# Patient Record
Sex: MALE | Race: WHITE
[De-identification: names, ages, dates, MRNs, and addresses within clinical notes are randomized per-mention and may not be internally consistent; named-entity substitution may affect disease eponyms.]

---

## 2019-11-20 ENCOUNTER — HOSPITAL ENCOUNTER (OUTPATIENT)
Dept: HOSPITAL 75 - ER | Age: 29
Setting detail: OBSERVATION
LOS: 1 days | Discharge: PSYCHIATRIC OR SUBSTANCE USE FACILITY | End: 2019-11-21
Attending: INTERNAL MEDICINE | Admitting: INTERNAL MEDICINE
Payer: SELF-PAY

## 2019-11-20 VITALS — WEIGHT: 250.45 LBS | HEIGHT: 71.65 IN | BODY MASS INDEX: 34.3 KG/M2

## 2019-11-20 DIAGNOSIS — F32.9: ICD-10-CM

## 2019-11-20 DIAGNOSIS — R45.851: Primary | ICD-10-CM

## 2019-11-20 DIAGNOSIS — F10.129: ICD-10-CM

## 2019-11-20 DIAGNOSIS — Z91.5: ICD-10-CM

## 2019-11-20 DIAGNOSIS — F19.90: ICD-10-CM

## 2019-11-20 DIAGNOSIS — F17.299: ICD-10-CM

## 2019-11-20 LAB
ALBUMIN SERPL-MCNC: 5.3 GM/DL (ref 3.2–4.5)
ALP SERPL-CCNC: 59 U/L (ref 40–136)
ALT SERPL-CCNC: 16 U/L (ref 0–55)
BASOPHILS # BLD AUTO: 0.2 10^3/UL (ref 0–0.1)
BASOPHILS NFR BLD AUTO: 1 % (ref 0–10)
BILIRUB SERPL-MCNC: 0.5 MG/DL (ref 0.1–1)
BUN/CREAT SERPL: 9
CALCIUM SERPL-MCNC: 9.4 MG/DL (ref 8.5–10.1)
CHLORIDE SERPL-SCNC: 105 MMOL/L (ref 98–107)
CO2 SERPL-SCNC: 20 MMOL/L (ref 21–32)
CREAT SERPL-MCNC: 1.03 MG/DL (ref 0.6–1.3)
EOSINOPHIL # BLD AUTO: 0.1 10^3/UL (ref 0–0.3)
EOSINOPHIL NFR BLD AUTO: 1 % (ref 0–10)
ERYTHROCYTE [DISTWIDTH] IN BLOOD BY AUTOMATED COUNT: 14 % (ref 10–14.5)
GFR SERPLBLD BASED ON 1.73 SQ M-ARVRAT: > 60 ML/MIN
GLUCOSE SERPL-MCNC: 69 MG/DL (ref 70–105)
HCT VFR BLD CALC: 48 % (ref 40–54)
HGB BLD-MCNC: 16.4 G/DL (ref 13.3–17.7)
LYMPHOCYTES # BLD AUTO: 3.4 X 10^3 (ref 1–4)
LYMPHOCYTES NFR BLD AUTO: 29 % (ref 12–44)
MANUAL DIFFERENTIAL PERFORMED BLD QL: NO
MCH RBC QN AUTO: 32 PG (ref 25–34)
MCHC RBC AUTO-ENTMCNC: 34 G/DL (ref 32–36)
MCV RBC AUTO: 95 FL (ref 80–99)
MONOCYTES # BLD AUTO: 0.5 X 10^3 (ref 0–1)
MONOCYTES NFR BLD AUTO: 5 % (ref 0–12)
NEUTROPHILS # BLD AUTO: 7.4 X 10^3 (ref 1.8–7.8)
NEUTROPHILS NFR BLD AUTO: 64 % (ref 42–75)
PLATELET # BLD: 394 10^3/UL (ref 130–400)
PMV BLD AUTO: 9.9 FL (ref 7.4–10.4)
POTASSIUM SERPL-SCNC: 3.7 MMOL/L (ref 3.6–5)
PROT SERPL-MCNC: 8.2 GM/DL (ref 6.4–8.2)
SALICYLATES SERPL-MCNC: < 5 MG/DL (ref 5–20)
SODIUM SERPL-SCNC: 148 MMOL/L (ref 135–145)
WBC # BLD AUTO: 11.6 10^3/UL (ref 4.3–11)

## 2019-11-20 PROCEDURE — 80320 DRUG SCREEN QUANTALCOHOLS: CPT

## 2019-11-20 PROCEDURE — 36415 COLL VENOUS BLD VENIPUNCTURE: CPT

## 2019-11-20 PROCEDURE — 80329 ANALGESICS NON-OPIOID 1 OR 2: CPT

## 2019-11-20 PROCEDURE — 87081 CULTURE SCREEN ONLY: CPT

## 2019-11-20 PROCEDURE — 80306 DRUG TEST PRSMV INSTRMNT: CPT

## 2019-11-20 PROCEDURE — 93005 ELECTROCARDIOGRAM TRACING: CPT

## 2019-11-20 PROCEDURE — 83735 ASSAY OF MAGNESIUM: CPT

## 2019-11-20 PROCEDURE — 85025 COMPLETE CBC W/AUTO DIFF WBC: CPT

## 2019-11-20 PROCEDURE — 81000 URINALYSIS NONAUTO W/SCOPE: CPT

## 2019-11-20 PROCEDURE — 80048 BASIC METABOLIC PNL TOTAL CA: CPT

## 2019-11-20 PROCEDURE — 80053 COMPREHEN METABOLIC PANEL: CPT

## 2019-11-20 PROCEDURE — 93041 RHYTHM ECG TRACING: CPT

## 2019-11-21 VITALS — SYSTOLIC BLOOD PRESSURE: 126 MMHG | DIASTOLIC BLOOD PRESSURE: 75 MMHG

## 2019-11-21 VITALS — DIASTOLIC BLOOD PRESSURE: 76 MMHG | SYSTOLIC BLOOD PRESSURE: 135 MMHG

## 2019-11-21 VITALS — DIASTOLIC BLOOD PRESSURE: 77 MMHG | SYSTOLIC BLOOD PRESSURE: 131 MMHG

## 2019-11-21 VITALS — DIASTOLIC BLOOD PRESSURE: 91 MMHG | SYSTOLIC BLOOD PRESSURE: 145 MMHG

## 2019-11-21 VITALS — SYSTOLIC BLOOD PRESSURE: 133 MMHG | DIASTOLIC BLOOD PRESSURE: 70 MMHG

## 2019-11-21 VITALS — SYSTOLIC BLOOD PRESSURE: 138 MMHG | DIASTOLIC BLOOD PRESSURE: 72 MMHG

## 2019-11-21 VITALS — DIASTOLIC BLOOD PRESSURE: 77 MMHG | SYSTOLIC BLOOD PRESSURE: 130 MMHG

## 2019-11-21 VITALS — SYSTOLIC BLOOD PRESSURE: 126 MMHG | DIASTOLIC BLOOD PRESSURE: 78 MMHG

## 2019-11-21 LAB
APAP SERPL-MCNC: < 10 UG/ML (ref 10–30)
APTT PPP: YELLOW S
BACTERIA #/AREA URNS HPF: (no result) /HPF
BARBITURATES UR QL: NEGATIVE
BASOPHILS # BLD AUTO: 0.1 10^3/UL (ref 0–0.1)
BASOPHILS NFR BLD AUTO: 1 % (ref 0–10)
BENZODIAZ UR QL SCN: POSITIVE
BILIRUB UR QL STRIP: NEGATIVE
BUN/CREAT SERPL: 16
CALCIUM SERPL-MCNC: 8.7 MG/DL (ref 8.5–10.1)
CHLORIDE SERPL-SCNC: 102 MMOL/L (ref 98–107)
CO2 SERPL-SCNC: 16 MMOL/L (ref 21–32)
COCAINE UR QL: NEGATIVE
CREAT SERPL-MCNC: 0.76 MG/DL (ref 0.6–1.3)
EOSINOPHIL # BLD AUTO: 0 10^3/UL (ref 0–0.3)
EOSINOPHIL NFR BLD AUTO: 0 % (ref 0–10)
ERYTHROCYTE [DISTWIDTH] IN BLOOD BY AUTOMATED COUNT: 14 % (ref 10–14.5)
FIBRINOGEN PPP-MCNC: CLEAR MG/DL
GFR SERPLBLD BASED ON 1.73 SQ M-ARVRAT: > 60 ML/MIN
GLUCOSE SERPL-MCNC: 66 MG/DL (ref 70–105)
GLUCOSE UR STRIP-MCNC: NEGATIVE MG/DL
HCT VFR BLD CALC: 46 % (ref 40–54)
HGB BLD-MCNC: 15.8 G/DL (ref 13.3–17.7)
HYALINE CASTS #/AREA URNS LPF: (no result) /LPF
KETONES UR QL STRIP: (no result)
LEUKOCYTE ESTERASE UR QL STRIP: NEGATIVE
LYMPHOCYTES # BLD AUTO: 2.9 X 10^3 (ref 1–4)
LYMPHOCYTES NFR BLD AUTO: 25 % (ref 12–44)
MAGNESIUM SERPL-MCNC: 1.8 MG/DL (ref 1.6–2.4)
MANUAL DIFFERENTIAL PERFORMED BLD QL: NO
MCH RBC QN AUTO: 33 PG (ref 25–34)
MCHC RBC AUTO-ENTMCNC: 34 G/DL (ref 32–36)
MCV RBC AUTO: 96 FL (ref 80–99)
METHADONE UR QL SCN: NEGATIVE
METHAMPHETAMINE SCREEN URINE S: NEGATIVE
MONOCYTES # BLD AUTO: 0.5 X 10^3 (ref 0–1)
MONOCYTES NFR BLD AUTO: 4 % (ref 0–12)
NEUTROPHILS # BLD AUTO: 7.9 X 10^3 (ref 1.8–7.8)
NEUTROPHILS NFR BLD AUTO: 70 % (ref 42–75)
NITRITE UR QL STRIP: NEGATIVE
OPIATES UR QL SCN: NEGATIVE
OXYCODONE UR QL: NEGATIVE
PH UR STRIP: 5.5 [PH] (ref 5–9)
PLATELET # BLD: 357 10^3/UL (ref 130–400)
PMV BLD AUTO: 9.7 FL (ref 7.4–10.4)
POTASSIUM SERPL-SCNC: 4.3 MMOL/L (ref 3.6–5)
PROPOXYPH UR QL: NEGATIVE
PROT UR QL STRIP: NEGATIVE
RBC #/AREA URNS HPF: (no result) /HPF
SODIUM SERPL-SCNC: 140 MMOL/L (ref 135–145)
SP GR UR STRIP: 1.02 (ref 1.02–1.02)
SQUAMOUS #/AREA URNS HPF: (no result) /HPF
TRICYCLICS UR QL SCN: NEGATIVE
WBC # BLD AUTO: 11.4 10^3/UL (ref 4.3–11)
WBC #/AREA URNS HPF: (no result) /HPF

## 2019-11-21 NOTE — NUR
BRIEFLY SPOKE WITH PT ABOUT THOUGHTS OF SUICIDE. PT STATES HE DOES NOT REALLY WANT TO TALK 
ABOUT IT BUT THAT HIS SUICIDAL THOUGHTS HAVE BEEN ON-GOING FOR MULTIPLE YEARS. HE STATES 
THAT HE KNOWS THAT HE NEEDED HELP SO HE CAME TO THE ER. STATES HE WILL NOT HARM HIMSELF 
DURING HIS HOSPITALIZATION AND SIGNED A NO-HARM CONTRACT AND THIS RN PLACED IT IN HIS CHART. 
TELE-SITTER HAS BEEN ORDERED AND EXPLAINED TO THE PT AS TO WHY IT HAS BEEN PLACED IN HIM 
ROOM. PT IS VERY COOPERATIVE AND THANKFUL.

## 2019-11-21 NOTE — NUR
Arrangements made with [facility] for pt admission for psychiatric 
treatment. Pt agreeable and cooperative.He didn't want anyone notified of his transfer. 
Addie Vanessa will provide secure transport to [facility]  on this date. Pt acknowledges 
severe depression. His mother Shellie  from cancer recently. He is living with his 
step-father and is unemployed. He presents flat depressed affect and appears tearful when 
talking about his mother's recent death. Pt' transport should be here between 12:30 and 1300 
hr.

## 2019-11-21 NOTE — PULMONARY CONSULTATION
History of Present Illness


History of Present Illness


Time Seen by Provider:  06:17


Date of Admission





History of Present Illness








Allergies and Home Medications


Allergies


Coded Allergies:  


     No Known Drug Allergies (Unverified , 11/21/19)





Past Medical-Social-Family Hx


Past Med/Social Hx:  Reviewed Nursing Past Med/Soc Hx


Patient Social History


Alcohol Use:  Occasionally Uses


Recreational Drug Use:  Yes (marijuana)


Type Used:  Smokeless Tobacco


Recent Foreign Travel:  No


Contact w/Someone Who Travel:  No


Recent Infectious Disease Expo:  No


Recent Hopitalizations:  No


Physical Abuse:  No


Sexual Abuse:  No


Mistreated:  No


Fear:  No





Seasonal Allergies


Seasonal Allergies:  No





Past Medical History


Surgeries:  No


Respiratory:  No


Cardiac:  No


Neurological:  No


Genitourinary:  No


Gastrointestinal:  No


Musculoskeletal:  No


Endocrine:  No


Cancer:  No


Psychosocial:  Yes


Depression


Integumentary:  No


Blood Disorders:  No





Family Medical History


Reviewed Nursing Family Hx





Sepsis Event


Evaluation


Height, Weight, BMI


Height: '"


Weight: lbs. oz. kg; 34.00 BMI


Method:





Exam


Exam





Vital Signs








  Date Time  Temp Pulse Resp B/P (MAP) Pulse Ox O2 Delivery O2 Flow Rate FiO2


 


11/21/19 05:00 37.0 72 13 131/77 (95) 99 Room Air  


 


11/21/19 04:00 36.6 78 17 133/70 (91) 99 Room Air  


 


11/21/19 04:00     98 Room Air  


 


11/21/19 03:00 36.5 82 15 135/76 (95) 98 Room Air  


 


11/21/19 02:29     98 Room Air  


 


11/21/19 02:00 36.5 85 16 145/91 (109) 98 Room Air  


 


11/21/19 01:33 37.0 86 20 133/84 (113) 100 Room Air  


 


11/20/19 23:36 37.0 115 20 158/91 (113) 98 Room Air  














I & O 


 


 11/21/19





 07:00


 


Intake Total 0 ml


 


Output Total 0 ml


 


Balance 0 ml








Height & Weight


Height: '"


Weight: lbs. oz. kg; 34.00 BMI


Method:


Capillary Refill:  Less Than 3 Seconds


Gastrointestinal:  non tender, soft





Results


Lab


Laboratory Tests


11/20/19 23:31








11/21/19 02:55











Assessment/Plan


Assessment/Plan


ETOH intoxication 


   -Monitor for withdrawals 


Depression with hx of suicidal ideation 


   -Consult behavioral ESTHELA Garibay DO              Nov 21, 2019 06:19


POS

## 2019-11-21 NOTE — NUR
PT FLAT AND WITHDRAWN DURING INTERACTION.  HE DENIED THOUGHTS OF SUICIDE BUT JUST STATED HE 
WAS TRYING NOT TO THINK ABOUT IT.  PER DR. CONWAY VITALS CAN BE OBTAINED EVERY FOUR HOURS.  
ALL CORDS OUT OF ROOM SECONDARY TO PATIENTS HISTORY OF ATTEMPTED HANGING.  NO TELE ON.  TELE 
SITTER AT BEDSIDE.  SPOKE WITH SAVE LINE AT 0750 AND WILL COME AND EVALUATE PATIENT IN "A 
FEW HOURS".

## 2019-11-21 NOTE — ED PSYCHOSOCIAL
General


Chief Complaint:  Psych/Social Disorder


Stated Complaint:  PSYCH EVAL


Nursing Triage Note:  


Pt to [facility] 8 with c/o "feeling nutty". Pt states he has Hx of depression, does not




take any meds for. Pt reports drinking 2 pints of whiskey tonight. Pt 


interrupted me during a question and states "I need to be strapped down to a 


gurney". Pt states he attempted suicide a few months ago by "hanging with belt".




 Pt states he has the same thoughts and plans tonight. Pt has changed into gown 


and belongings are in a bag behind doors.


Source:  patient


Exam Limitations:  intoxication





History of Present Illness


Date Seen by Provider:  Nov 20, 2019


Time Seen by Provider:  23:45


Initial Comments


Here with report of drinking 2 pints of whiskey tonight rapidly. States that he 

felt like he was given a snap. He is becoming increasingly slurred of speech. 

States he tried to hang himself a couple of months ago and that he is quite 

depressed. Very difficult to get any significant information out of him due to 

intoxication. No obvious injury. Denies taking any pills or other substances. T

old triage nurse about concerns of suicidal ideation.


Timing/Duration:  this evening


Severity:  moderate, severe


Associated Symptoms:  suicidal ideation





Allergies and Home Medications


Patient Home Medication List


Home Medication List Reviewed:  Yes





Review of Systems


Constitutional:  see HPI; No chills, No fever


Psychiatric/Neurological:  Anxiety, Depressed, Emotional Problems


Unable to complete review of systems due to intoxication





Past Medical-Social-Family Hx


Past Med/Social Hx:  Reviewed Nursing Past Med/Soc Hx


Patient Social History


Alcohol Use:  Occasionally Uses


Recreational Drug Use:  Yes (marijuana)


Type Used:  Smokeless Tobacco


Recent Foreign Travel:  No


Contact w/Someone Who Travel:  No


Recent Infectious Disease Expo:  No


Recent Hopitalizations:  No


Physical Abuse:  No


Sexual Abuse:  No


Mistreated:  No


Fear:  No





Seasonal Allergies


Seasonal Allergies:  No





Past Medical History


Surgeries:  No


Respiratory:  No


Cardiac:  No


Neurological:  No


Genitourinary:  No


Gastrointestinal:  No


Musculoskeletal:  No


Endocrine:  No


Cancer:  No


Psychosocial:  Yes


Depression


Integumentary:  No


Blood Disorders:  No





Family Medical History


Reviewed Nursing Family Hx





Physical Exam





Vital Signs - First Documented








 11/20/19





 23:36


 


Temp 37.0


 


Pulse 115


 


Resp 20


 


B/P (MAP) 158/91 (113)


 


Pulse Ox 98


 


O2 Delivery Room Air





Capillary Refill : Less Than 3 Seconds


Height, Weight, BMI


Height: '"


Weight: lbs. oz. kg; 34.00 BMI


Method:


General Appearance:  WD/WN, no apparent distress


HEENT:  PERRL/EOMI, pharynx normal


Neck:  full range of motion, supple


Respiratory:  lungs clear, normal breath sounds


Cardiovascular:  no murmur, tachycardia


Gastrointestinal:  non tender, soft


Extremities:  non-tender, normal inspection


Neurologic/Psychiatric:  other (slurred speech and was answers a few questions 

but is quite intoxicated. Does arouse to verbal. Unable to determine true 

orientation.)


Appearance/Memory:  disheveled, impaired insight


Behavior/Eye Contact:  decreased rate of speech


Thoughts/Hallucinations:  other (unable to determine due to intoxication)


Skin:  normal color, damp





Progress/Results/Core Measures


Results/Orders


Lab Results





Laboratory Tests








Test


 11/20/19


23:31 11/21/19


00:25 Range/Units


 


 


White Blood Count


 11.6 H


 


 4.3-11.0


10^3/uL


 


Red Blood Count


 5.10 


 


 4.35-5.85


10^6/uL


 


Hemoglobin 16.4   13.3-17.7  G/DL


 


Hematocrit 48   40-54  %


 


Mean Corpuscular Volume 95   80-99  FL


 


Mean Corpuscular Hemoglobin 32   25-34  PG


 


Mean Corpuscular Hemoglobin


Concent 34 


 


 32-36  G/DL





 


Red Cell Distribution Width 14.0   10.0-14.5  %


 


Platelet Count


 394 


 


 130-400


10^3/uL


 


Mean Platelet Volume 9.9   7.4-10.4  FL


 


Neutrophils (%) (Auto) 64   42-75  %


 


Lymphocytes (%) (Auto) 29   12-44  %


 


Monocytes (%) (Auto) 5   0-12  %


 


Eosinophils (%) (Auto) 1   0-10  %


 


Basophils (%) (Auto) 1   0-10  %


 


Neutrophils # (Auto) 7.4   1.8-7.8  X 10^3


 


Lymphocytes # (Auto) 3.4   1.0-4.0  X 10^3


 


Monocytes # (Auto) 0.5   0.0-1.0  X 10^3


 


Eosinophils # (Auto)


 0.1 


 


 0.0-0.3


10^3/uL


 


Basophils # (Auto)


 0.2 H


 


 0.0-0.1


10^3/uL


 


Sodium Level 148 H  135-145  MMOL/L


 


Potassium Level 3.7   3.6-5.0  MMOL/L


 


Chloride Level 105     MMOL/L


 


Carbon Dioxide Level 20 L  21-32  MMOL/L


 


Anion Gap 23 H  5-14  MMOL/L


 


Blood Urea Nitrogen 9   7-18  MG/DL


 


Creatinine


 1.03 


 


 0.60-1.30


MG/DL


 


Estimat Glomerular Filtration


Rate > 60 


 


  





 


BUN/Creatinine Ratio 9    


 


Glucose Level 69 L    MG/DL


 


Calcium Level 9.4   8.5-10.1  MG/DL


 


Corrected Calcium    8.5-10.1  MG/DL


 


Total Bilirubin 0.5   0.1-1.0  MG/DL


 


Aspartate Amino Transf


(AST/SGOT) 21 


 


 5-34  U/L





 


Alanine Aminotransferase


(ALT/SGPT) 16 


 


 0-55  U/L





 


Alkaline Phosphatase 59     U/L


 


Total Protein 8.2   6.4-8.2  GM/DL


 


Albumin 5.3 H  3.2-4.5  GM/DL


 


Salicylates Level < 5.0 L  5.0-20.0  MG/DL


 


Acetaminophen Level < 10 L  10-30  UG/ML


 


Serum Alcohol 342 *H  <10  MG/DL


 


Urine Color  YELLOW   


 


Urine Clarity  CLEAR   


 


Urine pH  5.5  5-9  


 


Urine Specific Gravity  1.025 H 1.016-1.022  


 


Urine Protein  NEGATIVE  NEGATIVE  


 


Urine Glucose (UA)  NEGATIVE  NEGATIVE  


 


Urine Ketones  1+ H NEGATIVE  


 


Urine Nitrite  NEGATIVE  NEGATIVE  


 


Urine Bilirubin  NEGATIVE  NEGATIVE  


 


Urine Urobilinogen  0.2  < = 1.0  MG/DL


 


Urine Leukocyte Esterase  NEGATIVE  NEGATIVE  


 


Urine RBC (Auto)  TRACE-I  NEGATIVE  


 


Urine RBC  NONE   /HPF


 


Urine WBC  NONE   /HPF


 


Urine Squamous Epithelial


Cells 


 RARE 


  /HPF





 


Urine Crystals  NONE   /LPF


 


Urine Bacteria  TRACE   /HPF


 


Urine Casts  PRESENT   /LPF


 


Urine Hyaline Casts  RARE   /LPF


 


Urine Mucus  SMALL H  /LPF


 


Urine Culture Indicated  NO   


 


Urine Opiates Screen  NEGATIVE  NEGATIVE  


 


Urine Oxycodone Screen  NEGATIVE  NEGATIVE  


 


Urine Methadone Screen  NEGATIVE  NEGATIVE  


 


Urine Propoxyphene Screen  NEGATIVE  NEGATIVE  


 


Urine Barbiturates Screen  NEGATIVE  NEGATIVE  


 


Ur Tricyclic Antidepressants


Screen 


 NEGATIVE 


 NEGATIVE  





 


Urine Phencyclidine Screen  NEGATIVE  NEGATIVE  


 


Urine Amphetamines Screen  NEGATIVE  NEGATIVE  


 


Urine Methamphetamines Screen  NEGATIVE  NEGATIVE  


 


Urine Benzodiazepines Screen  POSITIVE H NEGATIVE  


 


Urine Cocaine Screen  NEGATIVE  NEGATIVE  


 


Urine Cannabinoids Screen  POSITIVE H NEGATIVE  








My Orders





Orders - RAYA WOLFE MD


Ua Culture If Indicated (11/20/19 23:08)


Cbc With Automated Diff (11/20/19 23:08)


Comprehensive Metabolic Panel (11/20/19 23:08)


Alcohol (11/20/19 23:08)


Drug Screen Stat (Urine) (11/20/19 23:08)


Acetaminophen (11/20/19 23:08)


Salicylate (11/20/19 23:08)


Ekg Tracing (11/20/19 23:08)


Ed Iv/Invasive Line Start (11/20/19 23:08)


Monitor-Rhythm Ecg Trace Only (11/20/19 23:08)


Bh Status Checks/Observation Q15M (11/20/19 23:08)


Ed Iv/Invasive Line Start (11/20/19 23:08)





Vital Signs/I&O











 11/20/19





 23:36


 


Temp 37.0


 


Pulse 115


 


Resp 20


 


B/P (MAP) 158/91 (113)


 


Pulse Ox 98


 


O2 Delivery Room Air














Blood Pressure Mean:                    113


POS








Progress


Progress Note :  


Progress Note


Seen and evaluated. IV, labs, EKG, UA, UDS LR 1 L bolus. Monitor patient. 0042: 

Alcohol 342. Patient is quite intoxicated. We are unable to determine suicide 

ideation or intent due to significant intoxication. Rest of the labs reviewed. I

did discuss the case with Dr. BALLARD. He accepts patient for admission, 

observation status. Further mental health evaluation when medically cleared. I 

did discuss this with the patient and he seemed to agree.


Initial ECG Impression Date:  Nov 20, 2019


Initial ECG Impression Time:  23:34


Initial ECG Rate:  98


Initial ECG Rhythm:  Normal Sinus


Comment


Sinus rhythm with left atrial abdomen bilaterally. Normal axis. No evidence of 

ST elevation MI. No previous available that is an accurate EKG. Interpreted by 

me.





Departure


Communication (Admissions)


Time/Spoke to Admitting Phy:  00:42





Impression





   Primary Impression:  


   Alcohol abuse


   Additional Impressions:  


   Alcohol intoxication


   Qualified Codes:  F10.920 - Alcohol use, unspecified with intoxication, 

   uncomplicated


   Depression with suicidal ideation


Disposition:  09 ADMITTED AS INPATIENT


Condition:  Stable





Admissions


Decision to Admit Reason:  Admit from ER (General)


Decision to Admit/Date:  Nov 21, 2019


Time/Decision to Admit Time:  00:42





Departure-Patient Inst.


Referrals:  


JALEN SIMMONS MD (PCP/Family)


Primary Care Physician











RAYA WOLFE MD          Nov 21, 2019 00:54


POS

## 2019-11-21 NOTE — SHORT STAY SUMMARY-HOSPITALIST
History of Present Illness


HPI/Chief Complaint


Pt is a 28yoCM with a PMH of depression and suicide attempts who presented to 

the ER due to suicidal ideation. He states that he doesn't like to talk about it

and is quite guarded. He has a history of suicide attempts and previous attempts

include hanging himself with a belt. He states this was his plan again. He does 

not have access to guns at this time. He states he is still having thoughts of 

hurting himself and otherwise does not want to talk about it. He has relayed to 

staff that his mom recently  of cancer and that has been an acute stressor. 

He was admitted last night due to alcohol intoxication for mental health 

evaluation this morning now that he is sober.


Source:  patient


Date Seen


19


Time Seen by a Provider:  08:43


Attending Physician


Barrie Guzman MD


PCP


Corona Singh MD


Referring Physician





Date of Admission


2019 at 00:47





Home Medications & Allergies


Home Medications


Reviewed patient Home Medication Reconciliation performed by pharmacy medication

reconciliations technician and/or nursing.


Patients Allergies have been reviewed.





Allergies





Allergies


Coded Allergies


  No Known Drug Allergies ([id number]/21/19)








Past Medical-Social-Family Hx


Past Med/Social Hx:  Reviewed Nursing Past Med/Soc Hx


Patient Social History


Alcohol Use:  Occasionally Uses


Recreational Drug Use:  Yes (marijuana)


Type Used:  Smokeless Tobacco


Recent Foreign Travel:  No


Contact w/other who traveled:  No


Recent Hopitalizations:  No


Recent Infectious Disease Expo:  No





Seasonal Allergies


Seasonal Allergies:  No





Past Medical History


Psychosocial:  Suicide Attempts, Depression


History of Blood Disorders:  No





Family History


Reviewed Nursing Family Hx


Cancer





Review of Systems


Constitutional:  no symptoms reported


EENTM:  no symptoms reported


Respiratory:  no symptoms reported


Cardiovascular:  no symptoms reported


Gastrointestinal:  no symptoms reported


Genitourinary:  no symptoms reported


Musculoskeletal:  no symptoms reported


Skin:  no symptoms reported


Psychiatric/Neurological:  See HPI, Depressed





Physical Exam


Physical Exam


Vital Signs





Vital Signs - First Documented








 19





 23:36


 


Temp 37.0


 


Pulse 115


 


Resp 20


 


B/P (MAP) 158/91 (113)


 


Pulse Ox 98


 


O2 Delivery Room Air





Capillary Refill : Less Than 3 Seconds


Height, Weight, BMI


Height: '"


Weight: lbs. oz. kg; 34.00 BMI


Method:


General Appearance:  No Apparent Distress, WD/WN, Obese


Respiratory:  Lungs Clear, No Accessory Muscle Use, No Respiratory Distress


Cardiovascular:  Regular Rate, Rhythm, No Murmur


Gastrointestinal:  Normal Bowel Sounds, Non Tender, Soft


Extremity:  No Calf Tenderness, No Pedal Edema


Neurologic/Psychiatric:  Alert, Oriented x3, Depressed Affect


Skin:  Normal Color, Warm/Dry





Results


Results/Procedures


Labs


Laboratory Tests


19 23:31








19 02:55








Patient resulted labs reviewed.





Short Stay Diagnosis


Discharge Diagnosis-Short Stay


Admission Diagnosis


Suicidal Ideation


Final Discharge Diagnosis


Suicidal Ideation





Conclusion


Plan


Suicidal Ideation


   History of attempts


   Agrees to voluntary placement


   I called and spoke with Dr Yanez at [facility] for psych admission and he accepted 

patient


   DC to psych with secure transfer





Diagnosis/Problems


Diagnosis/Problems





(1) Suicidal ideation


Status:  Acute


(2) Alcohol intoxication


Qualifiers:  


   Qualified Codes:  F10.920 - Alcohol use, unspecified with intoxication, 

uncomplicated





Clinical Quality Measures


DVT/VTE Risk/Contraindication:


RFS Level Per Nursing on Admit:  0=No Risk/No VTE PPX











PRITI CONWAY MD              2019 08:43


POS

## 2019-11-21 NOTE — NUR
PT TRANSPORTED VIA SECURE TRANSPORT TO [facility].  PT ONLY HAD CLOTHING FOR 
PERSONAL BELONGINGS.  REPORT CALLED TO ALICE CLARKE RN AT [facility].  IV REMOVED 
AND TIP INTACT.  PT AMBULATED INDEPENDENTLY TO TRANSPORT VEHICLE.

## 2019-12-20 ENCOUNTER — HOSPITAL ENCOUNTER (EMERGENCY)
Dept: HOSPITAL 75 - ER | Age: 29
Discharge: HOME | End: 2019-12-20
Payer: SELF-PAY

## 2019-12-20 VITALS — DIASTOLIC BLOOD PRESSURE: 69 MMHG | SYSTOLIC BLOOD PRESSURE: 107 MMHG

## 2019-12-20 VITALS — HEIGHT: 73.62 IN | BODY MASS INDEX: 36.4 KG/M2 | WEIGHT: 280.65 LBS

## 2019-12-20 DIAGNOSIS — S02.2XXA: Primary | ICD-10-CM

## 2019-12-20 DIAGNOSIS — F10.129: ICD-10-CM
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
## 2019-12-20 DIAGNOSIS — F32.9: ICD-10-CM

## 2019-12-20 DIAGNOSIS — Z91.5: ICD-10-CM

## 2019-12-20 LAB
ALBUMIN SERPL-MCNC: 4.6 GM/DL (ref 3.2–4.5)
ALP SERPL-CCNC: 48 U/L (ref 40–136)
ALT SERPL-CCNC: 21 U/L (ref 0–55)
AMYLASE SERPL-CCNC: 86 U/L (ref 25–125)
APAP SERPL-MCNC: < 10 UG/ML (ref 10–30)
APTT BLD: 29 SEC (ref 24–35)
APTT PPP: YELLOW S
BACTERIA #/AREA URNS HPF: (no result) /HPF
BARBITURATES UR QL: NEGATIVE
BASOPHILS # BLD AUTO: 0.1 10^3/UL (ref 0–0.1)
BASOPHILS NFR BLD AUTO: 1 % (ref 0–10)
BENZODIAZ UR QL SCN: POSITIVE
BILIRUB SERPL-MCNC: 0.2 MG/DL (ref 0.1–1)
BILIRUB UR QL STRIP: NEGATIVE
BUN/CREAT SERPL: 9
CALCIUM SERPL-MCNC: 9 MG/DL (ref 8.5–10.1)
CHLORIDE SERPL-SCNC: 107 MMOL/L (ref 98–107)
CO2 SERPL-SCNC: 31 MMOL/L (ref 21–32)
COCAINE UR QL: NEGATIVE
CREAT SERPL-MCNC: 0.82 MG/DL (ref 0.6–1.3)
EOSINOPHIL # BLD AUTO: 0.3 10^3/UL (ref 0–0.3)
EOSINOPHIL NFR BLD AUTO: 4 % (ref 0–10)
ERYTHROCYTE [DISTWIDTH] IN BLOOD BY AUTOMATED COUNT: 13.5 % (ref 10–14.5)
FIBRINOGEN PPP-MCNC: CLEAR MG/DL
GFR SERPLBLD BASED ON 1.73 SQ M-ARVRAT: > 60 ML/MIN
GLUCOSE SERPL-MCNC: 102 MG/DL (ref 70–105)
GLUCOSE UR STRIP-MCNC: NEGATIVE MG/DL
HCT VFR BLD CALC: 44 % (ref 40–54)
HGB BLD-MCNC: 14.7 G/DL (ref 13.3–17.7)
INR PPP: 0.9 (ref 0.8–1.4)
KETONES UR QL STRIP: NEGATIVE
LEUKOCYTE ESTERASE UR QL STRIP: NEGATIVE
LIPASE SERPL-CCNC: 27 U/L (ref 8–78)
LYMPHOCYTES # BLD AUTO: 3.5 X 10^3 (ref 1–4)
LYMPHOCYTES NFR BLD AUTO: 43 % (ref 12–44)
MAGNESIUM SERPL-MCNC: 2.1 MG/DL (ref 1.6–2.4)
MANUAL DIFFERENTIAL PERFORMED BLD QL: NO
MCH RBC QN AUTO: 33 PG (ref 25–34)
MCHC RBC AUTO-ENTMCNC: 34 G/DL (ref 32–36)
MCV RBC AUTO: 98 FL (ref 80–99)
METHADONE UR QL SCN: NEGATIVE
METHAMPHETAMINE SCREEN URINE S: NEGATIVE
MONOCYTES # BLD AUTO: 0.7 X 10^3 (ref 0–1)
MONOCYTES NFR BLD AUTO: 8 % (ref 0–12)
NEUTROPHILS # BLD AUTO: 3.7 X 10^3 (ref 1.8–7.8)
NEUTROPHILS NFR BLD AUTO: 44 % (ref 42–75)
NITRITE UR QL STRIP: NEGATIVE
OPIATES UR QL SCN: NEGATIVE
OXYCODONE UR QL: NEGATIVE
PH UR STRIP: 8 [PH] (ref 5–9)
PLATELET # BLD: 266 10^3/UL (ref 130–400)
PMV BLD AUTO: 9.8 FL (ref 7.4–10.4)
POTASSIUM SERPL-SCNC: 4.4 MMOL/L (ref 3.6–5)
PROPOXYPH UR QL: NEGATIVE
PROT SERPL-MCNC: 7 GM/DL (ref 6.4–8.2)
PROT UR QL STRIP: NEGATIVE
PROTHROMBIN TIME: 12.2 SEC (ref 12.2–14.7)
RBC #/AREA URNS HPF: (no result) /HPF
SODIUM SERPL-SCNC: 146 MMOL/L (ref 135–145)
SP GR UR STRIP: 1.01 (ref 1.02–1.02)
SQUAMOUS #/AREA URNS HPF: (no result) /HPF
TRICYCLICS UR QL SCN: NEGATIVE
WBC # BLD AUTO: 8.3 10^3/UL (ref 4.3–11)
WBC #/AREA URNS HPF: (no result) /HPF

## 2019-12-20 PROCEDURE — 80329 ANALGESICS NON-OPIOID 1 OR 2: CPT

## 2019-12-20 PROCEDURE — 90715 TDAP VACCINE 7 YRS/> IM: CPT

## 2019-12-20 PROCEDURE — 70450 CT HEAD/BRAIN W/O DYE: CPT

## 2019-12-20 PROCEDURE — 70486 CT MAXILLOFACIAL W/O DYE: CPT

## 2019-12-20 PROCEDURE — 81000 URINALYSIS NONAUTO W/SCOPE: CPT

## 2019-12-20 PROCEDURE — 71045 X-RAY EXAM CHEST 1 VIEW: CPT

## 2019-12-20 PROCEDURE — 85025 COMPLETE CBC W/AUTO DIFF WBC: CPT

## 2019-12-20 PROCEDURE — 83690 ASSAY OF LIPASE: CPT

## 2019-12-20 PROCEDURE — 80306 DRUG TEST PRSMV INSTRMNT: CPT

## 2019-12-20 PROCEDURE — 72170 X-RAY EXAM OF PELVIS: CPT

## 2019-12-20 PROCEDURE — 85610 PROTHROMBIN TIME: CPT

## 2019-12-20 PROCEDURE — 72125 CT NECK SPINE W/O DYE: CPT

## 2019-12-20 PROCEDURE — 36415 COLL VENOUS BLD VENIPUNCTURE: CPT

## 2019-12-20 PROCEDURE — 83735 ASSAY OF MAGNESIUM: CPT

## 2019-12-20 PROCEDURE — 82150 ASSAY OF AMYLASE: CPT

## 2019-12-20 PROCEDURE — 80320 DRUG SCREEN QUANTALCOHOLS: CPT

## 2019-12-20 PROCEDURE — 96361 HYDRATE IV INFUSION ADD-ON: CPT

## 2019-12-20 PROCEDURE — 90471 IMMUNIZATION ADMIN: CPT

## 2019-12-20 PROCEDURE — 96374 THER/PROPH/DIAG INJ IV PUSH: CPT

## 2019-12-20 PROCEDURE — 85730 THROMBOPLASTIN TIME PARTIAL: CPT

## 2019-12-20 PROCEDURE — 80053 COMPREHEN METABOLIC PANEL: CPT

## 2019-12-20 NOTE — NUR
This RN checked on pt at this time. Pt is resting in bed, no needs at this 
time. Call light in reach.

## 2019-12-20 NOTE — DIAGNOSTIC IMAGING REPORT
INDICATION: Intoxication



Pelvis



AP view of the pelvis shows no fracture or dislocation.



IMPRESSION: Negative pelvis



Dictated by: 



  Dictated on workstation # [id number]
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
## 2019-12-20 NOTE — DIAGNOSTIC IMAGING REPORT
INDICATION: Intoxication



Portable chest 1:36 AM



Heart size and pulmonary vascularity are normal. Lungs are clear.

There are no effusions or pneumothoraces.



IMPRESSION: Negative chest.



Dictated by: 



  Dictated on workstation # [id number]

## 2020-01-08 ENCOUNTER — HOSPITAL ENCOUNTER (EMERGENCY)
Dept: HOSPITAL 75 - ER | Age: 30
Discharge: HOME | End: 2020-01-08
Payer: SELF-PAY

## 2020-01-08 VITALS — WEIGHT: 240.3 LBS | HEIGHT: 71.97 IN | BODY MASS INDEX: 32.55 KG/M2

## 2020-01-08 VITALS — DIASTOLIC BLOOD PRESSURE: 67 MMHG | SYSTOLIC BLOOD PRESSURE: 123 MMHG
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
## 2020-01-08 DIAGNOSIS — F32.9: ICD-10-CM

## 2020-01-08 DIAGNOSIS — Z77.22: ICD-10-CM

## 2020-01-08 DIAGNOSIS — S02.2XXA: Primary | ICD-10-CM

## 2020-01-08 PROCEDURE — 70450 CT HEAD/BRAIN W/O DYE: CPT
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
## 2020-02-04 ENCOUNTER — HOSPITAL ENCOUNTER (EMERGENCY)
Dept: HOSPITAL 75 - ER | Age: 30
Discharge: HOME | End: 2020-02-04
Payer: SELF-PAY

## 2020-02-04 VITALS — WEIGHT: 240.52 LBS | BODY MASS INDEX: 32.58 KG/M2 | HEIGHT: 72.01 IN

## 2020-02-04 VITALS — SYSTOLIC BLOOD PRESSURE: 156 MMHG | DIASTOLIC BLOOD PRESSURE: 99 MMHG

## 2020-02-04 DIAGNOSIS — F10.129: ICD-10-CM

## 2020-02-04 DIAGNOSIS — F17.290: ICD-10-CM

## 2020-02-04 DIAGNOSIS — S20.212A: ICD-10-CM

## 2020-02-04 DIAGNOSIS — F17.210: ICD-10-CM

## 2020-02-04 DIAGNOSIS — S52.202A: Primary | ICD-10-CM
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
## 2020-02-04 LAB
ALBUMIN SERPL-MCNC: 4.1 GM/DL (ref 3.2–4.5)
ALBUMIN SERPL-MCNC: 4.7 GM/DL (ref 3.2–4.5)
ALP SERPL-CCNC: 40 U/L (ref 40–136)
ALP SERPL-CCNC: 42 U/L (ref 40–136)
ALT SERPL-CCNC: 17 U/L (ref 0–55)
ALT SERPL-CCNC: 19 U/L (ref 0–55)
AMYLASE SERPL-CCNC: 70 U/L (ref 25–125)
APAP SERPL-MCNC: < 10 UG/ML (ref 10–30)
APTT BLD: 30 SEC (ref 24–35)
APTT PPP: YELLOW S
BACTERIA #/AREA URNS HPF: NEGATIVE /HPF
BARBITURATES UR QL: NEGATIVE
BASOPHILS # BLD AUTO: 0.1 10^3/UL (ref 0–0.1)
BASOPHILS NFR BLD AUTO: 1 % (ref 0–10)
BENZODIAZ UR QL SCN: NEGATIVE
BILIRUB SERPL-MCNC: 0.2 MG/DL (ref 0.1–1)
BILIRUB SERPL-MCNC: 0.2 MG/DL (ref 0.1–1)
BILIRUB UR QL STRIP: NEGATIVE
BUN/CREAT SERPL: 10
BUN/CREAT SERPL: 11
CALCIUM SERPL-MCNC: 8.4 MG/DL (ref 8.5–10.1)
CALCIUM SERPL-MCNC: 9.1 MG/DL (ref 8.5–10.1)
CHLORIDE SERPL-SCNC: 109 MMOL/L (ref 98–107)
CHLORIDE SERPL-SCNC: 110 MMOL/L (ref 98–107)
CK MB SERPL-MCNC: 3 NG/ML (ref ?–6.6)
CK SERPL-CCNC: 313 U/L (ref 30–200)
CO2 SERPL-SCNC: 20 MMOL/L (ref 21–32)
CO2 SERPL-SCNC: 22 MMOL/L (ref 21–32)
COCAINE UR QL: NEGATIVE
CREAT SERPL-MCNC: 0.71 MG/DL (ref 0.6–1.3)
CREAT SERPL-MCNC: 0.78 MG/DL (ref 0.6–1.3)
EOSINOPHIL # BLD AUTO: 0.3 10^3/UL (ref 0–0.3)
EOSINOPHIL NFR BLD AUTO: 4 % (ref 0–10)
ERYTHROCYTE [DISTWIDTH] IN BLOOD BY AUTOMATED COUNT: 12.7 % (ref 10–14.5)
FIBRINOGEN PPP-MCNC: CLEAR MG/DL
GFR SERPLBLD BASED ON 1.73 SQ M-ARVRAT: > 60 ML/MIN
GFR SERPLBLD BASED ON 1.73 SQ M-ARVRAT: > 60 ML/MIN
GLUCOSE SERPL-MCNC: 98 MG/DL (ref 70–105)
GLUCOSE SERPL-MCNC: 99 MG/DL (ref 70–105)
GLUCOSE UR STRIP-MCNC: NEGATIVE MG/DL
HCT VFR BLD CALC: 46 % (ref 40–54)
HGB BLD-MCNC: 15.1 G/DL (ref 13.3–17.7)
INR PPP: 0.9 (ref 0.8–1.4)
KETONES UR QL STRIP: NEGATIVE
LEUKOCYTE ESTERASE UR QL STRIP: NEGATIVE
LIPASE SERPL-CCNC: 23 U/L (ref 8–78)
LYMPHOCYTES # BLD AUTO: 1.8 X 10^3 (ref 1–4)
LYMPHOCYTES NFR BLD AUTO: 23 % (ref 12–44)
MAGNESIUM SERPL-MCNC: 2.2 MG/DL (ref 1.6–2.4)
MANUAL DIFFERENTIAL PERFORMED BLD QL: NO
MCH RBC QN AUTO: 33 PG (ref 25–34)
MCHC RBC AUTO-ENTMCNC: 33 G/DL (ref 32–36)
MCV RBC AUTO: 99 FL (ref 80–99)
METHADONE UR QL SCN: NEGATIVE
METHAMPHETAMINE SCREEN URINE S: NEGATIVE
MONOCYTES # BLD AUTO: 0.5 X 10^3 (ref 0–1)
MONOCYTES NFR BLD AUTO: 7 % (ref 0–12)
NEUTROPHILS # BLD AUTO: 5.1 X 10^3 (ref 1.8–7.8)
NEUTROPHILS NFR BLD AUTO: 66 % (ref 42–75)
NITRITE UR QL STRIP: NEGATIVE
OPIATES UR QL SCN: NEGATIVE
OXYCODONE UR QL: NEGATIVE
PH UR STRIP: 6.5 [PH] (ref 5–9)
PLATELET # BLD: 322 10^3/UL (ref 130–400)
PMV BLD AUTO: 10 FL (ref 7.4–10.4)
POTASSIUM SERPL-SCNC: 4 MMOL/L (ref 3.6–5)
POTASSIUM SERPL-SCNC: 6 MMOL/L (ref 3.6–5)
PROPOXYPH UR QL: NEGATIVE
PROT SERPL-MCNC: 6.4 GM/DL (ref 6.4–8.2)
PROT SERPL-MCNC: 8 GM/DL (ref 6.4–8.2)
PROT UR QL STRIP: NEGATIVE
PROTHROMBIN TIME: 12.8 SEC (ref 12.2–14.7)
RBC #/AREA URNS HPF: (no result) /HPF
SODIUM SERPL-SCNC: 142 MMOL/L (ref 135–145)
SODIUM SERPL-SCNC: 143 MMOL/L (ref 135–145)
SP GR UR STRIP: 1.01 (ref 1.02–1.02)
SQUAMOUS #/AREA URNS HPF: (no result) /HPF
TRICYCLICS UR QL SCN: NEGATIVE
WBC # BLD AUTO: 7.7 10^3/UL (ref 4.3–11)
WBC #/AREA URNS HPF: (no result) /HPF

## 2020-02-04 PROCEDURE — 80306 DRUG TEST PRSMV INSTRMNT: CPT

## 2020-02-04 PROCEDURE — 93005 ELECTROCARDIOGRAM TRACING: CPT

## 2020-02-04 PROCEDURE — 73090 X-RAY EXAM OF FOREARM: CPT

## 2020-02-04 PROCEDURE — 82150 ASSAY OF AMYLASE: CPT

## 2020-02-04 PROCEDURE — 85610 PROTHROMBIN TIME: CPT

## 2020-02-04 PROCEDURE — 81000 URINALYSIS NONAUTO W/SCOPE: CPT

## 2020-02-04 PROCEDURE — 99291 CRITICAL CARE FIRST HOUR: CPT

## 2020-02-04 PROCEDURE — 71260 CT THORAX DX C+: CPT

## 2020-02-04 PROCEDURE — 72131 CT LUMBAR SPINE W/O DYE: CPT

## 2020-02-04 PROCEDURE — 36415 COLL VENOUS BLD VENIPUNCTURE: CPT

## 2020-02-04 PROCEDURE — 93041 RHYTHM ECG TRACING: CPT

## 2020-02-04 PROCEDURE — 83874 ASSAY OF MYOGLOBIN: CPT

## 2020-02-04 PROCEDURE — 73060 X-RAY EXAM OF HUMERUS: CPT

## 2020-02-04 PROCEDURE — 72170 X-RAY EXAM OF PELVIS: CPT

## 2020-02-04 PROCEDURE — 82550 ASSAY OF CK (CPK): CPT

## 2020-02-04 PROCEDURE — 74177 CT ABD & PELVIS W/CONTRAST: CPT

## 2020-02-04 PROCEDURE — 80053 COMPREHEN METABOLIC PANEL: CPT

## 2020-02-04 PROCEDURE — 70450 CT HEAD/BRAIN W/O DYE: CPT

## 2020-02-04 PROCEDURE — 83735 ASSAY OF MAGNESIUM: CPT

## 2020-02-04 PROCEDURE — 83690 ASSAY OF LIPASE: CPT

## 2020-02-04 PROCEDURE — 72128 CT CHEST SPINE W/O DYE: CPT

## 2020-02-04 PROCEDURE — 85730 THROMBOPLASTIN TIME PARTIAL: CPT

## 2020-02-04 PROCEDURE — 71045 X-RAY EXAM CHEST 1 VIEW: CPT

## 2020-02-04 PROCEDURE — 80329 ANALGESICS NON-OPIOID 1 OR 2: CPT

## 2020-02-04 PROCEDURE — 82553 CREATINE MB FRACTION: CPT

## 2020-02-04 PROCEDURE — 99292 CRITICAL CARE ADDL 30 MIN: CPT

## 2020-02-04 PROCEDURE — 85025 COMPLETE CBC W/AUTO DIFF WBC: CPT

## 2020-02-04 PROCEDURE — 80320 DRUG SCREEN QUANTALCOHOLS: CPT

## 2020-02-04 PROCEDURE — 29125 APPL SHORT ARM SPLINT STATIC: CPT

## 2020-02-04 PROCEDURE — 72125 CT NECK SPINE W/O DYE: CPT
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
## 2020-08-26 ENCOUNTER — HOSPITAL ENCOUNTER (EMERGENCY)
Dept: HOSPITAL 75 - ER | Age: 30
Discharge: HOME | End: 2020-08-26
Payer: SELF-PAY

## 2020-08-26 VITALS — DIASTOLIC BLOOD PRESSURE: 91 MMHG | SYSTOLIC BLOOD PRESSURE: 142 MMHG

## 2020-08-26 VITALS — HEIGHT: 72.05 IN | BODY MASS INDEX: 32.55 KG/M2 | WEIGHT: 240.3 LBS

## 2020-08-26 DIAGNOSIS — T25.221A: Primary | ICD-10-CM

## 2020-08-26 DIAGNOSIS — F17.210: ICD-10-CM
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
## 2020-08-26 DIAGNOSIS — F17.290: ICD-10-CM

## 2020-08-26 PROCEDURE — 90715 TDAP VACCINE 7 YRS/> IM: CPT

## 2020-08-26 PROCEDURE — 99284 EMERGENCY DEPT VISIT MOD MDM: CPT

## 2020-08-26 RX ADMIN — SODIUM CHLORIDE ONE MLS/HR: 900 INJECTION INTRAVENOUS at 06:12

## 2020-08-26 RX ADMIN — SODIUM CHLORIDE ONE MLS/HR: 900 INJECTION INTRAVENOUS at 06:05
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
## 2021-04-05 ENCOUNTER — HOSPITAL ENCOUNTER (EMERGENCY)
Dept: HOSPITAL 75 - ER | Age: 31
Discharge: HOME | End: 2021-04-05
Payer: SELF-PAY

## 2021-04-05 VITALS — DIASTOLIC BLOOD PRESSURE: 100 MMHG | SYSTOLIC BLOOD PRESSURE: 152 MMHG

## 2021-04-05 VITALS — WEIGHT: 250.45 LBS | HEIGHT: 71.97 IN | BODY MASS INDEX: 33.92 KG/M2

## 2021-04-05 DIAGNOSIS — F10.231: Primary | ICD-10-CM

## 2021-04-05 DIAGNOSIS — F17.220: ICD-10-CM
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
## 2021-04-05 LAB
ALBUMIN SERPL-MCNC: 4.6 GM/DL (ref 3.2–4.5)
ALP SERPL-CCNC: 156 U/L (ref 40–136)
ALT SERPL-CCNC: 107 U/L (ref 0–55)
BASOPHILS # BLD AUTO: 0.1 10^3/UL (ref 0–0.1)
BASOPHILS NFR BLD AUTO: 2 % (ref 0–10)
BILIRUB SERPL-MCNC: 6.4 MG/DL (ref 0.1–1)
BUN/CREAT SERPL: 7
CALCIUM SERPL-MCNC: 9.1 MG/DL (ref 8.5–10.1)
CHLORIDE SERPL-SCNC: 91 MMOL/L (ref 98–107)
CO2 SERPL-SCNC: 18 MMOL/L (ref 21–32)
CREAT SERPL-MCNC: 0.81 MG/DL (ref 0.6–1.3)
EOSINOPHIL # BLD AUTO: 0 10^3/UL (ref 0–0.3)
EOSINOPHIL NFR BLD AUTO: 0 % (ref 0–10)
GFR SERPLBLD BASED ON 1.73 SQ M-ARVRAT: > 60 ML/MIN
GLUCOSE SERPL-MCNC: 107 MG/DL (ref 70–105)
HCT VFR BLD CALC: 40 % (ref 40–54)
HGB BLD-MCNC: 13.6 G/DL (ref 13.3–17.7)
INR PPP: 1 (ref 0.8–1.4)
LYMPHOCYTES # BLD AUTO: 0.8 10^3/UL (ref 1–4)
LYMPHOCYTES NFR BLD AUTO: 16 % (ref 12–44)
MANUAL DIFFERENTIAL PERFORMED BLD QL: NO
MCH RBC QN AUTO: 35 PG (ref 25–34)
MCHC RBC AUTO-ENTMCNC: 34 G/DL (ref 32–36)
MCV RBC AUTO: 105 FL (ref 80–99)
MONOCYTES # BLD AUTO: 0.7 10^3/UL (ref 0–1)
MONOCYTES NFR BLD AUTO: 13 % (ref 0–12)
NEUTROPHILS # BLD AUTO: 3.6 10^3/UL (ref 1.8–7.8)
NEUTROPHILS NFR BLD AUTO: 68 % (ref 42–75)
PLATELET # BLD: 138 10^3/UL (ref 130–400)
PMV BLD AUTO: 11.3 FL (ref 9–12.2)
POTASSIUM SERPL-SCNC: 3.7 MMOL/L (ref 3.6–5)
PROT SERPL-MCNC: 7.3 GM/DL (ref 6.4–8.2)
PROTHROMBIN TIME: 14 SEC (ref 12.2–14.7)
SODIUM SERPL-SCNC: 132 MMOL/L (ref 135–145)
WBC # BLD AUTO: 5.3 10^3/UL (ref 4.3–11)

## 2021-04-05 PROCEDURE — 80053 COMPREHEN METABOLIC PANEL: CPT

## 2021-04-05 PROCEDURE — 70450 CT HEAD/BRAIN W/O DYE: CPT

## 2021-04-05 PROCEDURE — 80320 DRUG SCREEN QUANTALCOHOLS: CPT

## 2021-04-05 PROCEDURE — 36415 COLL VENOUS BLD VENIPUNCTURE: CPT

## 2021-04-05 PROCEDURE — 85025 COMPLETE CBC W/AUTO DIFF WBC: CPT

## 2021-04-05 PROCEDURE — 99284 EMERGENCY DEPT VISIT MOD MDM: CPT

## 2021-04-05 PROCEDURE — 85610 PROTHROMBIN TIME: CPT

## 2021-04-05 PROCEDURE — 72125 CT NECK SPINE W/O DYE: CPT
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
## 2021-04-05 NOTE — ED NEUROLOGICAL PROBLEM
General


Chief Complaint:  Neurological Problems


Stated Complaint:  SEIZURE


Source:  patient


Exam Limitations:  no limitations





History of Present Illness


Date Seen by Provider:  Apr 5, 2021


Time Seen by Provider:  16:02


Initial Comments


 to ER with reports of seizure-like activity.  A jack noticed him to have 

seizure-like activity on the sidewalk [place] [employer] [place]  He reported to EMS that he did not know what happened but he was on 

his way to get a bottle of liquor.  He drinks heavily.  However he had a full 

bottle of [place] deluxe with him.  He does seem to have bit his tongue.


Timing/Duration:  1-3 hours


Severity:  moderate


Associated Symptoms:  seizures





Allergies and Home Medications


Allergies


Coded Allergies:  


     No Known Drug Allergies (Unverified , 11/21/19)





Home Medications


Sulfamethoxazole/Trimethoprim 1 Each Tablet, 1 EACH PO BID


   Prescribed by: LESIA HOOD on 8/26/20 0605





Patient Home Medication List


Home Medication List Reviewed:  Yes





Review of Systems


Review of Systems


Constitutional:  see HPI


Eyes:  No Symptoms Reported


Ears, Nose, Mouth, Throat:  no symptoms reported


Respiratory:  no symptoms reported


Cardiovascular:  no symptoms reported


Genitourinary:  no symptoms reported


Musculoskeletal:  no symptoms reported


Skin:  no symptoms reported


Psychiatric/Neurological:  See HPI, Anxiety


Endocrine:  No Symptoms Reported


Hematologic/Lymphatic:  No Symptoms Reported





Past Medical-Social-Family Hx


Patient Social History


Alcohol Beverage of Choice:  Beer, Whiskey


Drug of Choice:  THC Hx: Norco


Type Used:  Cigarettes, Smokeless Tobacco


2nd Hand Smoke Exposure:  Yes


Recent Hopitalizations:  No





Immunizations Up To Date


Tetanus Booster (TDap):  Unknown





Seasonal Allergies


Seasonal Allergies:  No





Past Medical History


Surgeries:  Yes


Tonsillectomy


Respiratory:  No


Cardiac:  No


Neurological:  No


Genitourinary:  No


Gastrointestinal:  No


Musculoskeletal:  No


Endocrine:  No


HEENT:  No


Cancer:  No


Psychosocial:  Yes (POLYSUBSTANCE ABUSE)


Suicide Attempts, Depression


Integumentary:  No


Blood Disorders:  No





Family Medical History


Cancer





Physical Exam


Vital Signs





Vital Signs - First Documented








 4/5/21





 16:06


 


Temp 37.0


 


Pulse 117


 


Resp 18


 


B/P (MAP) 153/103 (120)


 


Pulse Ox 98


 


O2 Delivery Room Air





Capillary Refill :


Height, Weight, BMI


Height: '"


Weight: lbs. oz. kg; 32.00 BMI


Method:


General Appearance:  WD/WN, no apparent distress


HEENT:  other (Alert, a little dried blood in his beard from a tongue laceration

on the right which does not require suture.  )


Neck:  non-tender, full range of motion


Respiratory:  no respiratory distress, no accessory muscle use


Cardiovascular:  no murmur, tachycardia


Gastrointestinal:  normal bowel sounds, non tender, soft


Neurologic/Psychiatric:  alert, normal mood/affect, oriented x 3


Crainal Nerves:  normal hearing, normal speech, PERRL


Motor/Sensory:  no motor deficit, no sensory deficit


Skin:  normal color, warm/dry





Progress/Results/Core Measures


Results/Orders


Lab Results





Laboratory Tests








Test


 4/5/21


16:02 Range/Units


 


 


White Blood Count


 5.3 


 4.3-11.0


10^3/uL


 


Red Blood Count


 3.84 L


 4.30-5.52


10^6/uL


 


Hemoglobin 13.6  13.3-17.7  g/dL


 


Hematocrit 40  40-54  %


 


Mean Corpuscular Volume 105 H 80-99  fL


 


Mean Corpuscular Hemoglobin 35 H 25-34  pg


 


Mean Corpuscular Hemoglobin


Concent 34 


 32-36  g/dL





 


Red Cell Distribution Width 12.1  10.0-14.5  %


 


Platelet Count


 138 


 130-400


10^3/uL


 


Mean Platelet Volume 11.3  9.0-12.2  fL


 


Immature Granulocyte % (Auto) 1   %


 


Neutrophils (%) (Auto) 68  42-75  %


 


Lymphocytes (%) (Auto) 16  12-44  %


 


Monocytes (%) (Auto) 13 H 0-12  %


 


Eosinophils (%) (Auto) 0  0-10  %


 


Basophils (%) (Auto) 2  0-10  %


 


Neutrophils # (Auto)


 3.6 


 1.8-7.8


10^3/uL


 


Lymphocytes # (Auto)


 0.8 L


 1.0-4.0


10^3/uL


 


Monocytes # (Auto)


 0.7 


 0.0-1.0


10^3/uL


 


Eosinophils # (Auto)


 0.0 


 0.0-0.3


10^3/uL


 


Basophils # (Auto)


 0.1 


 0.0-0.1


10^3/uL


 


Immature Granulocyte # (Auto)


 0.0 


 0.0-0.1


10^3/uL


 


Prothrombin Time 14.0  12.2-14.7  SEC


 


INR Comment 1.0  0.8-1.4  


 


Sodium Level 132 L 135-145  MMOL/L


 


Potassium Level 3.7  3.6-5.0  MMOL/L


 


Chloride Level 91 L   MMOL/L


 


Carbon Dioxide Level 18 L 21-32  MMOL/L


 


Anion Gap 23 H 5-14  MMOL/L


 


Blood Urea Nitrogen 6 L 7-18  MG/DL


 


Creatinine


 0.81 


 0.60-1.30


MG/DL


 


Estimat Glomerular Filtration


Rate > 60 


  





 


BUN/Creatinine Ratio 7   


 


Glucose Level 107 H   MG/DL


 


Calcium Level 9.1  8.5-10.1  MG/DL


 


Corrected Calcium   8.5-10.1  MG/DL


 


Total Bilirubin 6.4 H 0.1-1.0  MG/DL


 


Aspartate Amino Transf


(AST/SGOT) 308 H


 5-34  U/L





 


Alanine Aminotransferase


(ALT/SGPT) 107 H


 0-55  U/L





 


Alkaline Phosphatase 156 H   U/L


 


Total Protein 7.3  6.4-8.2  GM/DL


 


Albumin 4.6 H 3.2-4.5  GM/DL


 


Serum Alcohol < 10  <10  MG/DL








My Orders





Orders - ALTHEA LUNDBERG


Alcohol (4/5/21 16:03)


Protime With Inr (4/5/21 16:03)


Cbc With Automated Diff (4/5/21 16:03)


Comprehensive Metabolic Panel (4/5/21 16:03)


Ed Iv/Invasive Line Start (4/5/21 16:03)


Ns Iv 1000 Ml (Sodium Chloride 0.9%) (4/5/21 16:15)


Lorazepam Injection (Ativan Injection) (4/5/21 16:15)


Ct Head/Cervical Spine Wo (4/5/21 16:07)





Medications Given in ED





Vital Signs/I&O











 4/5/21 4/5/21





 16:06 17:27


 


Temp 37.0 


 


Pulse 117 115


 


Resp 18 18


 


B/P (MAP) 153/103 (120) 152/100


 


Pulse Ox 98 98


 


O2 Delivery Room Air Room Air











Departure


Communication (Admissions)


Given that he is withdrawing from alcohol and has no desire to stop his alcohol 

use disorder we gave him back his bottle of [place] deluxe and let him drink 

about 30 to 45 mL of it.  After about an hour he was still seizure-free, heart 

rate had fallen to 99 from 115-120.  We will discharge to home.





Impression





   Primary Impression:  


   Alcohol withdrawal seizure


Disposition:  01 HOME, SELF-CARE


Condition:  Stable





Departure-Patient Inst.


Decision time for Depature:  17:11


Referrals:  


NO,LOCAL PHYSICIAN (PCP/Family)


Primary Care Physician


Patient Instructions:  Alcohol Withdrawal (DC)











ALTHEA LUNDBERG              Apr 5, 2021 16:07

## 2022-12-15 ENCOUNTER — HOSPITAL ENCOUNTER (EMERGENCY)
Dept: HOSPITAL 75 - ER | Age: 32
Discharge: HOME | End: 2022-12-15
Payer: SELF-PAY

## 2022-12-15 VITALS — DIASTOLIC BLOOD PRESSURE: 56 MMHG | SYSTOLIC BLOOD PRESSURE: 120 MMHG

## 2022-12-15 VITALS — HEIGHT: 71.65 IN | BODY MASS INDEX: 35.62 KG/M2 | WEIGHT: 260.15 LBS

## 2022-12-15 DIAGNOSIS — F10.229: Primary | ICD-10-CM

## 2022-12-15 DIAGNOSIS — Z28.311: ICD-10-CM
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
## 2022-12-15 LAB
ALBUMIN SERPL-MCNC: 4.8 GM/DL (ref 3.2–4.5)
ALP SERPL-CCNC: 63 U/L (ref 40–136)
ALT SERPL-CCNC: 23 U/L (ref 0–55)
BARBITURATES UR QL: NEGATIVE
BASOPHILS # BLD AUTO: 0.2 10^3/UL (ref 0–0.1)
BASOPHILS NFR BLD AUTO: 1 % (ref 0–10)
BASOPHILS NFR BLD MANUAL: 0 %
BENZODIAZ UR QL SCN: NEGATIVE
BILIRUB SERPL-MCNC: 0.3 MG/DL (ref 0.1–1)
BUN/CREAT SERPL: 15
CALCIUM SERPL-MCNC: 8.9 MG/DL (ref 8.5–10.1)
CHLORIDE SERPL-SCNC: 104 MMOL/L (ref 98–107)
CO2 SERPL-SCNC: 23 MMOL/L (ref 21–32)
COCAINE UR QL: NEGATIVE
CREAT SERPL-MCNC: 0.67 MG/DL (ref 0.6–1.3)
EOSINOPHIL # BLD AUTO: 0.3 10^3/UL (ref 0–0.3)
EOSINOPHIL NFR BLD AUTO: 2 % (ref 0–10)
EOSINOPHIL NFR BLD MANUAL: 1 %
GFR SERPLBLD BASED ON 1.73 SQ M-ARVRAT: 126 ML/MIN
GLUCOSE SERPL-MCNC: 95 MG/DL (ref 70–105)
HCT VFR BLD CALC: 47 % (ref 40–54)
HGB BLD-MCNC: 16 G/DL (ref 13.3–17.7)
LYMPHOCYTES # BLD AUTO: 4 10^3/UL (ref 1–4)
LYMPHOCYTES NFR BLD AUTO: 27 % (ref 12–44)
MANUAL DIFFERENTIAL PERFORMED BLD QL: YES
MCH RBC QN AUTO: 33 PG (ref 25–34)
MCHC RBC AUTO-ENTMCNC: 34 G/DL (ref 32–36)
MCV RBC AUTO: 96 FL (ref 80–99)
METHADONE UR QL SCN: NEGATIVE
MONOCYTES # BLD AUTO: 1 10^3/UL (ref 0–1)
MONOCYTES NFR BLD AUTO: 7 % (ref 0–12)
MONOCYTES NFR BLD: 5 %
NEUTROPHILS # BLD AUTO: 9.2 10^3/UL (ref 1.8–7.8)
NEUTROPHILS NFR BLD AUTO: 62 % (ref 42–75)
NEUTS BAND NFR BLD MANUAL: 66 %
NEUTS BAND NFR BLD: 0 %
OPIATES UR QL SCN: NEGATIVE
OXYCODONE UR QL: NEGATIVE
PLATELET # BLD: 414 10^3/UL (ref 130–400)
PMV BLD AUTO: 10.3 FL (ref 9–12.2)
POTASSIUM SERPL-SCNC: 3.6 MMOL/L (ref 3.6–5)
PROPOXYPH UR QL: NEGATIVE
PROT SERPL-MCNC: 7.7 GM/DL (ref 6.4–8.2)
RBC MORPH BLD: NORMAL
SODIUM SERPL-SCNC: 142 MMOL/L (ref 135–145)
TRICYCLICS UR QL SCN: NEGATIVE
VARIANT LYMPHS NFR BLD MANUAL: 28 %
WBC # BLD AUTO: 14.8 10^3/UL (ref 4.3–11)

## 2022-12-15 PROCEDURE — 99282 EMERGENCY DEPT VISIT SF MDM: CPT

## 2022-12-15 PROCEDURE — 80306 DRUG TEST PRSMV INSTRMNT: CPT

## 2022-12-15 PROCEDURE — 85027 COMPLETE CBC AUTOMATED: CPT

## 2022-12-15 PROCEDURE — 36415 COLL VENOUS BLD VENIPUNCTURE: CPT

## 2022-12-15 PROCEDURE — 80320 DRUG SCREEN QUANTALCOHOLS: CPT

## 2022-12-15 PROCEDURE — 85007 BL SMEAR W/DIFF WBC COUNT: CPT

## 2022-12-15 PROCEDURE — 82947 ASSAY GLUCOSE BLOOD QUANT: CPT

## 2022-12-15 PROCEDURE — 80053 COMPREHEN METABOLIC PANEL: CPT

## 2022-12-15 NOTE — ED GENERAL
General


Chief Complaint:  Unresponsive


Stated Complaint:  ALCOHOL


Nursing Triage Note:  


PT TO [facility] BY [facility] WITH CC OF BEING FOUND UNRESPONSIVE AT THE [facility]. PT APPEARS HOMELESS AS HE WAS ASKED IF HE SLEPT IN A WARM 


OR COLD PLACE LAST NIGHT AND HE SAID A COLD PLACE. HALF A BOTTLE OF VODKA WITH 


PT AND A COUPLE COATS, PT DID TELL EMS THAT HE HAD DRANK SOME ETOH. 18G IN LT 


HAND WITH FLUIDS RUNNING


Source of Information:  EMS


Exam Limitations:  Intoxication





History of Present Illness


Date Seen by Provider:  Dec 15, 2022


Time Seen by Provider:  14:10


Initial Comments


Patient is a 32-year-old male [place] unresponsive/poorly 

responsive.  Bottle of vodka found on his person.  When the EMS got to the 

library he was responding to sternal rub and loud voice.  No obvious outward 

signs of trauma.  He did admit to alcohol use.  He is homeless.





On arrival somnolent but answering questions.  Vital signs stable.  Odor of 

alcohol on his breath.  No complaints of pain.  Takes no daily medications.  

Denied drug use.  No recent injuries.  No recent illnesses that he reports.  Had

1 L of warm saline infusing on arrival.  Blood sugar 86.


Timing/Duration:  Other (unkmown)


Associated Systoms:  Denies Symptoms





Allergies and Home Medications


Allergies


Coded Allergies:  


     No Known Drug Allergies (Unverified , 11/21/19)





Patient Home Medication List


Home Medication List Reviewed:  Yes


Sulfamethoxazole/Trimethoprim (Bactrim Ds Tablet) 1 Each Tablet, 1 EACH PO BID


   Prescribed by: LESIA HOOD on 8/26/20 0605





Review of Systems


Review of Systems


Constitutional:  see HPI


Patient poor historian due to intoxication





Past Medical-Social-Family Hx


Patient Social History


Tobacco Use?:  No


Substance use?:  No


Alcohol Use?:  Yes


Alcohol type:  Hard Liquor


Alcohol Frequency:  Couple times a week





Immunizations Up To Date


Tetanus Booster (TDap):  Unknown


First/Initial COVID19 Vaccinat:  YES





Seasonal Allergies


Seasonal Allergies:  No





Past Medical History


Surgery/Hospitalization HX:  


DENIES MED HX


Surgeries:  Yes


Tonsillectomy


Respiratory:  No


Cardiac:  No


Neurological:  No


Genitourinary:  No


Gastrointestinal:  No


Musculoskeletal:  No


Endocrine:  No


HEENT:  No


Cancer:  No


Psychosocial:  Yes (POLYSUBSTANCE ABUSE)


Suicide Attempts, Depression


Integumentary:  No


Blood Disorders:  No





Family Medical History


Cancer





Physical Exam


Vital Signs





Vital Signs - First Documented








 12/15/22





 14:05


 


Temp 36.5


 


Pulse 84


 


Resp 16


 


B/P (MAP) 125/79 (94)


 


Pulse Ox 98


 


O2 Delivery Room Air





Capillary Refill : Less Than 3 Seconds


Height, Weight, BMI


Height: '"


Weight: lbs. oz. kg; 35.00 BMI


Method:


General Appearance:  No Apparent Distress, WD/WN


Eyes:  Bilateral Eye Normal Inspection, Bilateral Eye PERRL, Bilateral Eye EOMI


HEENT:  PERRL/EOMI


Neck:  Normal Inspection


Respiratory:  Lungs Clear, Normal Breath Sounds, No Accessory Muscle Use, No 

Respiratory Distress


Cardiovascular:  Regular Rate, Rhythm


Gastrointestinal:  Non Tender, Soft


Extremity:  Normal Inspection, Normal Range of Motion


Neurologic/Psychiatric:  Other (somnolent - but rouses to voice and answers 

questions appropriately. Speech does not seemed slurred)


Skin:  Normal Color, Warm/Dry





Progress/Results/Core Measures


Suspected Sepsis


SIRS


Temperature: 


Pulse: 84 


Respiratory Rate: 16


 


Laboratory Tests


12/15/22 14:10: White Blood Count 14.8H


Blood Pressure 125 /79 


Mean: 94


 


Laboratory Tests


12/15/22 14:10: 


Creatinine 0.67, Platelet Count 414H, Total Bilirubin 0.3








Results/Orders


Lab Results





Laboratory Tests








Test


 12/15/22


14:10 12/15/22


14:40 12/15/22


14:45 12/15/22


16:10 Range/Units


 


 


White Blood Count


 14.8 H


 


 


 


 4.3-11.0


10^3/uL


 


Red Blood Count


 4.93 


 


 


 


 4.30-5.52


10^6/uL


 


Hemoglobin 16.0     13.3-17.7  g/dL


 


Hematocrit 47     40-54  %


 


Mean Corpuscular Volume 96     80-99  fL


 


Mean Corpuscular Hemoglobin 33     25-34  pg


 


Mean Corpuscular Hemoglobin


Concent 34 


 


 


 


 32-36  g/dL





 


Red Cell Distribution Width 13.6     10.0-14.5  %


 


Platelet Count


 414 H


 


 


 


 130-400


10^3/uL


 


Mean Platelet Volume 10.3     9.0-12.2  fL


 


Immature Granulocyte % (Auto) 1      %


 


Neutrophils (%) (Auto) 62     42-75  %


 


Lymphocytes (%) (Auto) 27     12-44  %


 


Monocytes (%) (Auto) 7     0-12  %


 


Eosinophils (%) (Auto) 2     0-10  %


 


Basophils (%) (Auto) 1     0-10  %


 


Neutrophils # (Auto)


 9.2 H


 


 


 


 1.8-7.8


10^3/uL


 


Lymphocytes # (Auto)


 4.0 


 


 


 


 1.0-4.0


10^3/uL


 


Monocytes # (Auto)


 1.0 


 


 


 


 0.0-1.0


10^3/uL


 


Eosinophils # (Auto)


 0.3 


 


 


 


 0.0-0.3


10^3/uL


 


Basophils # (Auto)


 0.2 H


 


 


 


 0.0-0.1


10^3/uL


 


Immature Granulocyte # (Auto)


 0.1 


 


 


 


 0.0-0.1


10^3/uL


 


Neutrophils % (Manual) 66      %


 


Lymphocytes % (Manual) 28      %


 


Monocytes % (Manual) 5      %


 


Eosinophils % (Manual) 1      %


 


Basophils % (Manual) 0      %


 


Band Neutrophils 0      %


 


Blood Morphology Comment NORMAL      


 


Sodium Level 142     135-145  MMOL/L


 


Potassium Level 3.6     3.6-5.0  MMOL/L


 


Chloride Level 104       MMOL/L


 


Carbon Dioxide Level 23     21-32  MMOL/L


 


Anion Gap 15 H    5-14  MMOL/L


 


Blood Urea Nitrogen 10     7-18  MG/DL


 


Creatinine


 0.67 


 


 


 


 0.60-1.30


MG/DL


 


Estimat Glomerular Filtration


Rate 126 


 


 


 


  





 


BUN/Creatinine Ratio 15      


 


Glucose Level 95       MG/DL


 


Calcium Level 8.9     8.5-10.1  MG/DL


 


Corrected Calcium      8.5-10.1  MG/DL


 


Total Bilirubin 0.3     0.1-1.0  MG/DL


 


Aspartate Amino Transf


(AST/SGOT) 31 


 


 


 


 5-34  U/L





 


Alanine Aminotransferase


(ALT/SGPT) 23 


 


 


 


 0-55  U/L





 


Alkaline Phosphatase 63       U/L


 


Total Protein 7.7     6.4-8.2  GM/DL


 


Albumin 4.8 H    3.2-4.5  GM/DL


 


Serum Alcohol 387 *H    <10  MG/DL


 


Glucometer  90   84    MG/DL


 


Urine Opiates Screen   NEGATIVE   NEGATIVE  


 


Urine Oxycodone Screen   NEGATIVE   NEGATIVE  


 


Urine Methadone Screen   NEGATIVE   NEGATIVE  


 


Urine Propoxyphene Screen   NEGATIVE   NEGATIVE  


 


Urine Barbiturates Screen   NEGATIVE   NEGATIVE  


 


Ur Tricyclic Antidepressants


Screen 


 


 NEGATIVE 


 


 NEGATIVE  





 


Urine Phencyclidine Screen   NEGATIVE   NEGATIVE  


 


Urine Amphetamines Screen   NEGATIVE   NEGATIVE  


 


Urine Methamphetamines Screen   NEGATIVE   NEGATIVE  


 


Urine Benzodiazepines Screen   NEGATIVE   NEGATIVE  


 


Urine Cocaine Screen   NEGATIVE   NEGATIVE  


 


Urine Cannabinoids Screen   NEGATIVE   NEGATIVE  








My Orders





Orders - MARK SCOTT MD


Ed Iv/Invasive Line Start (12/15/22 14:29)


Cbc With Automated Diff (12/15/22 14:29)


Comprehensive Metabolic Panel (12/15/22 14:29)


Alcohol (12/15/22 14:29)


Accucheck Stat ONCE (12/15/22 14:29)


Drug Screen Stat (Urine) (12/15/22 14:29)


Lactated Ringers (Lr 1000 Ml Iv Solution (12/15/22 14:30)


Manual Differential (12/15/22 14:10)


Accucheck Stat ONCE (12/15/22 16:07)





Vital Signs/I&O











 12/15/22





 14:05


 


Temp 36.5


 


Pulse 84


 


Resp 16


 


B/P (MAP) 125/79 (94)


 


Pulse Ox 98


 


O2 Delivery Room Air





Capillary Refill : Less Than 3 Seconds








Blood Pressure Mean:                    94











Point of Care Testing


Finger Stick Blood Glucose:  90


Blood Glucose Action Taken:  rn notified


Progress Note :  


   Time:  16:23


Progress Note


Patient seen and evaluated, 32-year-old male alcohol intoxication.  Basic labs 

obtained and reviewed, within normal limits except for his alcohol level 

slightly greater than 350.  He is treated with IV fluids.  Reevaluation of his 

blood sugar shows it to continue to be 84.  No clinical or objective findings to

warrant further investigation, no signs of trauma no indications for imaging.  

Patient will be discharged to home.





Departure


Impression





   Primary Impression:  


   Alcohol intoxication


   Qualified Codes:  F10.920 - Alcohol use, unspecified with intoxication, 

   uncomplicated


Disposition:  01 HOME, SELF-CARE


Condition:  Stable





Departure-Patient Inst.


Decision time for Depature:  16:24


Referrals:  


[facility] [place] [facility]





NO,LOCAL PHYSICIAN (PCP)


Primary Care Physician


Patient Instructions:  ALCOHOL AND SUBSTANCE ABUSE





Add. Discharge Instructions:  


Addiction Treatment Center


[facility] 


[address] [place] [address]


[phone]





[facility]


[phone]


[address] [place] [address]


Get Immediate Help 


[facility]





or Call [phone]-(SAVE)





You should follow up with Maggy MEDEL for alcohol addiction evaluation.





Please come back to the Emergency Department for any new, concerning or emergent

complaints.











MARK SCOTT MD         Dec 15, 2022 15:57

## 2023-01-30 ENCOUNTER — HOSPITAL ENCOUNTER (EMERGENCY)
Dept: HOSPITAL 75 - ER | Age: 33
Discharge: HOME | End: 2023-01-30
Payer: SELF-PAY

## 2023-01-30 VITALS — HEIGHT: 71.65 IN | BODY MASS INDEX: 35.59 KG/M2 | WEIGHT: 259.93 LBS

## 2023-01-30 VITALS — SYSTOLIC BLOOD PRESSURE: 121 MMHG | DIASTOLIC BLOOD PRESSURE: 79 MMHG

## 2023-01-30 DIAGNOSIS — F17.200: ICD-10-CM

## 2023-01-30 DIAGNOSIS — F10.229: Primary | ICD-10-CM

## 2023-01-30 PROCEDURE — 82947 ASSAY GLUCOSE BLOOD QUANT: CPT

## 2023-01-30 NOTE — ED GENERAL
General


Chief Complaint:  Substance Abuse


Stated Complaint:  INTOXICATED


Nursing Triage Note:  


PT PRESENTS TO ED VIA EMS FROM [facility] FOR ETOH INTOXICATION AND LETHARGY. PT WAS 


FOUND LAYING DOWN UNCOVERED IN THE COLD. PT REPORTS HE IS HOMELESS. PT RESPONDS 


TO VERBAL STIMULI AND ANSWERS QUESTIONS APROPRIATELY BUT FALLS ASLEEP WITHOUT 


STIMULUS. PT DENIES ANY PAIN OR RECENT INJURY


Source of Information:  Patient


Exam Limitations:  No Limitations


 (TEO HICKS)





History of Present Illness


Date Seen by Provider:  Jan 30, 2023


Time Seen by Provider:  15:14


Initial Comments


32-year-old male presents to the ED via EMS for alcohol intoxication.  EMS and 

police said they found him passed out outside with his pants down.  Patient 

alert to voice, oriented x4.  GCS 14.  Patient states he is here for "drinking t

oo much."  States he drinks a liter of alcohol a day since he was a teenager.  

Denies any fever/chills, headache, chest pain, shortness of air, abdominal pain,

nausea/vomiting.  Denies any medical concerns at this time.  Fingers are cool to

the touch, ends of fingers are blue in color.  Wrapped in a warm blanket.


 (TEO HICKS)





Allergies and Home Medications


Allergies


Coded Allergies:  


     No Known Drug Allergies (Unverified , 11/21/19)





Patient Home Medication List


Home Medication List Reviewed:  Yes


 (TEO HICKS)


Sulfamethoxazole/Trimethoprim (Bactrim Ds Tablet) 1 Each Tablet, 1 EACH PO BID


   Prescribed by: LESIA HOOD on 8/26/20 0605





Review of Systems


Review of Systems


Constitutional:  see HPI (TEO HICKS)





Past Medical-Social-Family Hx


Patient Social History


Tobacco Use?:  No


Smokeless Tobacco Frequency:  Current Everyday User


Substance use?:  No


Alcohol Use?:  Yes


Alcohol type:  Hard Liquor


Alcohol Frequency:  Daily


Pt feels they are or have been:  No


 (TEO HICKS)





Immunizations Up To Date


Tetanus Booster (TDap):  Unknown


First/Initial COVID19 Vaccinat:  YES


Second COVID19 Vaccination Kiran:  YES


Third COVID19 Vaccination Date:  YES


 (TEO HICKS)





Seasonal Allergies


Seasonal Allergies:  No


 (TEO HICKS)





Past Medical History


Surgery/Hospitalization HX:  


DENIES MED HX


Surgeries:  Yes


Tonsillectomy


Respiratory:  No


Cardiac:  No


Neurological:  No


Genitourinary:  No


Gastrointestinal:  No


Musculoskeletal:  No


Endocrine:  No


HEENT:  No


Cancer:  No


Psychosocial:  Yes (POLYSUBSTANCE ABUSE)


Suicide Attempts, Depression


Integumentary:  No


Blood Disorders:  No


 (TEO HICKS)





Family Medical History


Cancer


 (TEO HICKS)





Physical Exam


Vital Signs





Vital Signs - First Documented








 1/30/23 1/30/23





 15:14 19:44


 


Temp 35.8 


 


Pulse 86 


 


Resp 18 


 


B/P (MAP) 114/71 (85) 


 


Pulse Ox 96 


 


O2 Delivery  Room Air








 (VALDEMAR NUNEZ MD)


Vital Signs


Capillary Refill : Less Than 3 Seconds 


 (TEO HICKS)


Height, Weight, BMI


Height: '"


Weight: lbs. oz. kg; 35.00 BMI


Method:


General Appearance:  No Apparent Distress, WD/WN


Neck:  Non Tender, Supple


Respiratory:  Lungs Clear, Normal Breath Sounds, No Accessory Muscle Use, No 

Respiratory Distress


Cardiovascular:  Regular Rate, Rhythm, No Edema, No Gallop, No JVD, No Murmur


Gastrointestinal:  Normal Bowel Sounds, Non Tender, Soft


Extremity:  Normal Range of Motion, Other (Fingers cold to the touch, blue in 

color at the tip)


Neurologic/Psychiatric:  Alert (To verbal), Oriented x3, Normal Mood/Affect


Skin:  Normal Color, Warm/Dry, Other (Fingers cold)


Comments


Severely slurred speech


 (TEO HICKS)





Progress/Results/Core Measures


Suspected Sepsis


SIRS


Temperature: 


Pulse: 86 


Respiratory Rate: 18


 


Blood Pressure 114 /71 


Mean: 85


 


 (TEO HICKS)





Results/Orders


Lab Results





Laboratory Tests








Test


 1/30/23


15:35 Range/Units


 


 


Glucometer 116 H   MG/DL





 (VALDEMAR NUNEZ MD)


Vital Signs/I&O











 1/30/23 1/30/23





 15:14 19:44


 


Temp 35.8 


 


Pulse 86 78


 


Resp 18 20


 


B/P (MAP) 114/71 (85) 121/79


 


Pulse Ox 96 95


 


O2 Delivery  Room Air








 (VALDEMAR NUNEZ MD)


Vital Signs/I&O


Capillary Refill : Less Than 3 Seconds 


 (TEO HICKS)








Blood Pressure Mean:                    85











Point of Care Testing


Finger Stick Blood Glucose:  116


 (TEO HICKS)


Progress Note #1:  


   Time:  15:43


Progress Note


Patient seen and evaluated, resting in bed, no acute distress.  Patient's speech

is slurred, is oriented x4.  Patient's fingers are cool to the touch and blue in

color at the tips.  Placed in a warm blanket.  Warmed IV fluids ordered.  

Considered ordering a EtOH level, deferred due to patient admitting to drinking 

alcohol.


Progress Note #2:  


   Time:  16:45


Progress Note


Temperature of fingers is warmer, but still cool.  Color of fingers has returned

to normal.


Progress Note #3:  


   Time:  18:15


Progress Note


Nurse attempted to walk patient, patient unsteady on his feet.  We will continue

to monitor.


Progress Note #4:  


   Time:  19:23


Progress Note


Patient alert and oriented, ambulates with steady gait.  Slurred speech has 

improved.  Provided discharge instructions and return precautions


 (TEO HICKS)





Departure


Impression





   Primary Impression:  


   Alcohol intoxication


Disposition:  01 HOME, SELF-CARE


Condition:  Stable





Departure-Patient Inst.


Referrals:  


NO,LOCAL PHYSICIAN (PCP/Family)


Primary Care Physician


Patient Instructions:  ALCOHOL AND SUBSTANCE ABUSE





Add. Discharge Instructions:  


Please stop drinking.


Return for any new, concerning, or worsening symptoms.


Follow-up with your primary care provider.





All discharge instructions reviewed with patient and/or family. Voiced 

understanding.








ATTENDING PHYSICIAN NOTE:


I was physically present as attending physician in the emergency department 

during the care of this patient, but I was not directly involved in the decision

making or delivery of care for this patient. 


 (VALDEMAR NUNEZ MD)











TEO HICKS        Jan 30, 2023 15:43


VALDEMAR NUNEZ MD         Feb 1, 2023 05:39